# Patient Record
Sex: FEMALE | Race: WHITE
[De-identification: names, ages, dates, MRNs, and addresses within clinical notes are randomized per-mention and may not be internally consistent; named-entity substitution may affect disease eponyms.]

---

## 2020-12-03 ENCOUNTER — HOSPITAL ENCOUNTER (EMERGENCY)
Dept: HOSPITAL 46 - ED | Age: 40
Discharge: HOME | End: 2020-12-03
Payer: COMMERCIAL

## 2020-12-03 VITALS — WEIGHT: 179.01 LBS | HEIGHT: 64 IN | BODY MASS INDEX: 30.56 KG/M2

## 2020-12-03 DIAGNOSIS — F17.200: ICD-10-CM

## 2020-12-03 DIAGNOSIS — M54.5: Primary | ICD-10-CM

## 2020-12-03 NOTE — XMS
PreManage Notification: DELANO HARDEN MRN:[id number]
 
Security Information
 
Security Events
No recent Security Events currently on file
 
 
 
CRITERIA MET
------------
- [facility] - 2 Visits in 30 Days
 
 
CARE PROVIDERS
-------------------------------------------------------------------------------------
ALICIA DURÁN      Physician Assistant     Current
 
PHONE: [phone]
-------------------------------------------------------------------------------------
 
[facility] has no Care Guidelines for this patient.
 
E.D. VISIT COUNT (12 MO.)
-------------------------------------------------------------------------------------
[address]
-------------------------------------------------------------------------------------
TOTAL 3
-------------------------------------------------------------------------------------
NOTE: Visits indicate total known visits.
 
ED/UCC VISIT TRACKING (12 MO.)
-------------------------------------------------------------------------------------
12/03/2020 19:38
[facility]
 
TYPE: Emergency
 
COMPLAINT:
- LOW BACK PAIN/ NO INJ
-------------------------------------------------------------------------------------
11/14/2020 21:01
[facility]
 
TYPE: Emergency
 
COMPLAINT:
- ABD PAIN
- SHORTNESS OF BREATH
- UNSPECIFIED ABDOMINAL PAIN
- COUGH
 
DIAGNOSES:
0. Unspecified abdominal pain
1. Unspecified abdominal pain
 
5. Nausea
6. Other psychoactive substance abuse, uncomplicated
7. Nicotine dependence, cigarettes, uncomplicated
-------------------------------------------------------------------------------------
07/19/2020 21:28
[facility] [place]
 
TYPE: Emergency
 
DIAGNOSES:
- +screening, TOOTH ABCESS, DIZZY, FEVER
-------------------------------------------------------------------------------------
 
 
INPATIENT VISIT TRACKING (12 MO.)
No inpatient visits to display in this time frame
 
[email or web address]